# Patient Record
Sex: FEMALE | Race: WHITE | NOT HISPANIC OR LATINO | Employment: FULL TIME | URBAN - METROPOLITAN AREA
[De-identification: names, ages, dates, MRNs, and addresses within clinical notes are randomized per-mention and may not be internally consistent; named-entity substitution may affect disease eponyms.]

---

## 2017-05-02 ENCOUNTER — HOSPITAL ENCOUNTER (OUTPATIENT)
Dept: MAMMOGRAPHY | Facility: CLINIC | Age: 57
Discharge: HOME | End: 2017-05-02
Payer: COMMERCIAL

## 2017-05-02 DIAGNOSIS — Z12.31 ENCOUNTER FOR SCREENING MAMMOGRAM FOR MALIGNANT NEOPLASM OF BREAST: ICD-10-CM

## 2017-05-02 PROCEDURE — 77063 BREAST TOMOSYNTHESIS BI: CPT

## 2017-05-02 PROCEDURE — G0202 SCR MAMMO BI INCL CAD: HCPCS

## 2018-06-07 ENCOUNTER — ANNUAL EXAM (OUTPATIENT)
Dept: OBGYN CLINIC | Facility: CLINIC | Age: 58
End: 2018-06-07
Payer: COMMERCIAL

## 2018-06-07 VITALS
BODY MASS INDEX: 18.93 KG/M2 | WEIGHT: 120.6 LBS | HEIGHT: 67 IN | DIASTOLIC BLOOD PRESSURE: 64 MMHG | SYSTOLIC BLOOD PRESSURE: 118 MMHG

## 2018-06-07 DIAGNOSIS — N95.2 VAGINAL ATROPHY: ICD-10-CM

## 2018-06-07 DIAGNOSIS — Z01.419 WOMEN'S ANNUAL ROUTINE GYNECOLOGICAL EXAMINATION: Primary | ICD-10-CM

## 2018-06-07 DIAGNOSIS — Z12.31 VISIT FOR SCREENING MAMMOGRAM: ICD-10-CM

## 2018-06-07 PROCEDURE — 99396 PREV VISIT EST AGE 40-64: CPT | Performed by: OBSTETRICS & GYNECOLOGY

## 2018-06-07 RX ORDER — MULTIVIT-MIN/IRON/FOLIC ACID/K 18-600-40
CAPSULE ORAL
COMMUNITY
Start: 2016-06-17

## 2018-06-07 RX ORDER — RIBOFLAVIN (VITAMIN B2) 100 MG
TABLET ORAL
COMMUNITY

## 2018-06-07 NOTE — PROGRESS NOTES
Assessment/Plan:  1  Yearly exam-Pap smear deferred, self-breast awareness reviewed, calcium/vitamin-D recommendations discussed, mammogram request given, colonoscopy as per specialist   2   Family history of colon cancer-patient sister was diagnosed withMUTYH associated colon cancer, with test run by [facility]  Patient is interested in testing  She was referred to Davian Campos genetic counselor  A copy of the genetic testing was obtained and we will have it scanned into the medical record  3   History of basal cell/squamous cell skin cancer-patient continues to follow-up with dermatology  She has frequent moles  4   History of endometriosis/postcoital cramps/menopausal symptoms-denies any current symptoms  5   Mild vaginal atrophy-no symptoms noted  6   Elevated Tyrer [place] risk-noted to be 15 5 percent  Patient continues with 3D mammogram and request was given  She will follow-up in 1 year or as needed  No problem-specific Assessment & Plan notes found for this encounter  Diagnoses and all orders for this visit:    Women's annual routine gynecological examination    Visit for screening mammogram  -     Mammo screening bilateral w 3d & cad; Future    Other orders  -     Ascorbic Acid (VITAMIN C) 100 MG tablet; Take by mouth  -     Cholecalciferol (VITAMIN D) 2000 units CAPS; Take by mouth          Subjective:      Patient ID: Zenobia Mcgrath is a 59 y o  female  Patient was seen today for yearly exam   Please see assessment plan for details  The following portions of the patient's history were reviewed and updated as appropriate: allergies, current medications, past family history, past medical history, past social history, past surgical history and problem list     Review of Systems   Constitutional: Negative for chills, diaphoresis, fatigue and fever  Respiratory: Negative for apnea, cough, chest tightness, shortness of breath and wheezing      Cardiovascular: Negative for chest pain, palpitations and leg swelling  Gastrointestinal: Negative for abdominal distention, abdominal pain, anal bleeding, constipation, diarrhea, nausea, rectal pain and vomiting  Genitourinary: Negative for difficulty urinating, dyspareunia, dysuria, frequency, hematuria, menstrual problem, pelvic pain, urgency, vaginal bleeding, vaginal discharge and vaginal pain  Musculoskeletal: Negative for arthralgias, back pain and myalgias  Skin: Negative for color change and rash  Neurological: Negative for dizziness, syncope, light-headedness, numbness and headaches  Hematological: Negative for adenopathy  Does not bruise/bleed easily  Psychiatric/Behavioral: Negative for dysphoric mood and sleep disturbance  The patient is not nervous/anxious  Objective:      /64 (BP Location: Right arm, Patient Position: Sitting, Cuff Size: Standard)   Ht 5' 7" (1 702 m)   Wt 54 7 kg (120 lb 9 6 oz)   BMI 18 89 kg/m²          Physical Exam    Objective      /64 (BP Location: Right arm, Patient Position: Sitting, Cuff Size: Standard)   Ht 5' 7" (1 702 m)   Wt 54 7 kg (120 lb 9 6 oz)   BMI 18 89 kg/m²     General:   alert and oriented, in no acute distress, alert, appears stated age and cooperative   Neck: normal to inspection and palpation   Breast: normal appearance, no masses or tenderness   Heart:    Lungs:    Abdomen: soft, non-tender, without masses or organomegaly   Vulva: normal   Vagina: Mildly atrophic, without erythema or lesions or discharge  Cervix: Mildly atrophic, without lesions or discharge or cervicitis    No Cervical motion tenderness and normal   Uterus: top normal size, anteverted, non-tender   Adnexa: no mass, fullness, tenderness   Rectum: negative

## 2018-06-07 NOTE — PATIENT INSTRUCTIONS
Colorectal Cancer   WHAT YOU NEED TO KNOW:   What is colorectal cancer? Colorectal cancer starts in the large intestine (colon) or rectum  What increases my risk for colorectal cancer? · Diseases of the colon, such as polyps (small lumps of tissue) or ulcerative colitis    · A family history of colon cancer    · Foods that are high in fat, or high amounts of red meat, such as beef     · Foods that are low in fiber, or not enough fruits and vegetables     · Lack of physical activity     · Cigarette smoking or heavy alcohol use    · Medical conditions such as diabetes or obesity    · Exposure to chemicals, such as asbestos  What are the signs and symptoms of colorectal cancer? · Bloody or black bowel movements    · Abdominal pain or cramps, or a feeling of fullness    · Frequent fatigue or weakness    · Diarrhea or constipation    · Rectal pain    · Unexplained weight loss  How is colorectal cancer diagnosed? · A bowel movement sample  may be tested for blood  · An ultrasound or CT  may show where the tumor is located, or if the cancer has spread  You may be given contrast liquid to help the tumor show up better  Tell the healthcare provider if you have ever had an allergic reaction to contrast liquid  · A barium enema  is an x-ray of the colon  A tube is put into your anus, and a liquid called barium is put through the tube  Barium is used so that healthcare providers can see your colon better on the x-ray  · A colonoscopy  is a test that is done to look at your colon  A tube with a light on the end will be put into your anus, and then moved forward into your colon  Your healthcare provider may take tissue samples during the colonoscopy to be tested for cancer  How is colorectal cancer treated? · Surgery  may be needed to remove part of your colon, rectum, or lymph nodes  This may help stop the cancer from spreading      · Targeted therapy  is medicine used to target specific cancer cells and kill them  · Chemotherapy (chemo)  medicine is used to kill cancer cells  Chemo may be used to shrink the tumor or lymph nodes before surgery  Chemo may also be used after surgery to decrease the risk that cancer will come back  · Radiation therapy  is used to kill cancer cells with x-rays or gamma rays  Radiation may be given either before or after surgery to kill cancer cells  It may be given alone or with chemotherapy  What can I do to prevent or manage colorectal cancer? · Get screened as directed  Your healthcare provider may want you to have a yearly colonoscopy to check for colorectal cancer  Screening is recommended for all men and women older than 45 to 50 years  You may need to get screened earlier if you have colon disease or a family history of colorectal cancer  · Do not smoke  Nicotine can damage blood vessels and make it more difficult to manage your colorectal cancer  Smoking also increases your risk for new or returning cancer and delays healing after treatment  Do not use e-cigarettes or smokeless tobacco in place of cigarettes or to help you quit  They still contain nicotine  Ask your healthcare provider for information if you currently smoke and need help quitting  · Limit or do not drink alcohol as directed  Men should limit alcohol to 2 drinks per day  Women should limit alcohol to 1 drink per day  A drink of alcohol is 12 ounces of beer, 5 ounces of wine, or 1½ ounces of liquor  · Drink liquids as directed  Ask how much liquid to drink each day and which liquids are best for you  If you have nausea or diarrhea from cancer treatment, extra liquids may help decrease your risk for dehydration  · Eat healthy foods  Healthy foods include fruits, vegetables, whole-grain breads, low-fat dairy products, beans, lean meats, and fish  You may need to change what you eat during treatment   Do not eat foods or drink liquids that cause gas, such as cabbage, beans, onions, or soda  A dietitian may help to plan the best meals and snacks for you  · Exercise as directed  Ask about the best exercise plan for you  Exercise may improve your energy levels and appetite  Call 911 for any of the following:   · Your arm or leg feels warm, tender, and painful  It may look swollen and red  · You have chest pain when you take a deep breath or cough  · You suddenly feel lightheaded and short of breath  · You cough up blood  When should I contact my healthcare provider? · You have a fever  · You cannot control your diarrhea or constipation  · You vomit multiple times and cannot keep any food or liquids down  · Your pain is worse or does not go away after you take your pain medicine  · You see blood in your bowel movements  · You have questions or concerns about your condition or care  CARE AGREEMENT:   You have the right to help plan your care  Learn about your health condition and how it may be treated  Discuss treatment options with your caregivers to decide what care you want to receive  You always have the right to refuse treatment  The above information is an  only  It is not intended as medical advice for individual conditions or treatments  Talk to your doctor, nurse or pharmacist before following any medical regimen to see if it is safe and effective for you  © 2017 [facility] Information is for End User's use only and may not be sold, redistributed or otherwise used for commercial purposes  All illustrations and images included in CareNotes® are the copyrighted property of A D A M , Inc  or Nik[employer] Palma

## 2018-07-24 ENCOUNTER — HOSPITAL ENCOUNTER (OUTPATIENT)
Dept: MAMMOGRAPHY | Facility: CLINIC | Age: 58
Discharge: HOME | End: 2018-07-24
Payer: COMMERCIAL

## 2018-07-24 DIAGNOSIS — Z12.31 VISIT FOR SCREENING MAMMOGRAM: ICD-10-CM

## 2018-07-24 PROCEDURE — 77063 BREAST TOMOSYNTHESIS BI: CPT

## 2018-07-24 PROCEDURE — 77067 SCR MAMMO BI INCL CAD: CPT

## 2019-10-29 ENCOUNTER — TRANSCRIBE ORDERS (OUTPATIENT)
Dept: ADMINISTRATIVE | Facility: HOSPITAL | Age: 59
End: 2019-10-29

## 2019-10-29 DIAGNOSIS — Z12.31 ENCOUNTER FOR SCREENING MAMMOGRAM FOR MALIGNANT NEOPLASM OF BREAST: Primary | ICD-10-CM

## 2019-11-13 ENCOUNTER — ANNUAL EXAM (OUTPATIENT)
Dept: OBGYN CLINIC | Facility: CLINIC | Age: 59
End: 2019-11-13
Payer: COMMERCIAL

## 2019-11-13 VITALS
BODY MASS INDEX: 18.93 KG/M2 | WEIGHT: 120.6 LBS | HEIGHT: 67 IN | SYSTOLIC BLOOD PRESSURE: 132 MMHG | DIASTOLIC BLOOD PRESSURE: 80 MMHG

## 2019-11-13 DIAGNOSIS — Z11.51 SCREENING FOR HPV (HUMAN PAPILLOMAVIRUS): ICD-10-CM

## 2019-11-13 DIAGNOSIS — Z01.419 WOMEN'S ANNUAL ROUTINE GYNECOLOGICAL EXAMINATION: ICD-10-CM

## 2019-11-13 DIAGNOSIS — Z12.4 SCREENING FOR CERVICAL CANCER: Primary | ICD-10-CM

## 2019-11-13 DIAGNOSIS — Z12.31 ENCOUNTER FOR SCREENING MAMMOGRAM FOR MALIGNANT NEOPLASM OF BREAST: ICD-10-CM

## 2019-11-13 DIAGNOSIS — N95.2 VAGINAL ATROPHY: ICD-10-CM

## 2019-11-13 PROBLEM — K64.0 FIRST DEGREE HEMORRHOIDS: Status: ACTIVE | Noted: 2019-11-13

## 2019-11-13 PROBLEM — D12.6 BENIGN NEOPLASM OF COLON: Status: ACTIVE | Noted: 2019-11-13

## 2019-11-13 PROBLEM — K62.0 ANAL POLYP: Status: ACTIVE | Noted: 2019-11-13

## 2019-11-13 PROCEDURE — 99396 PREV VISIT EST AGE 40-64: CPT | Performed by: OBSTETRICS & GYNECOLOGY

## 2019-11-13 PROCEDURE — G0145 SCR C/V CYTO,THINLAYER,RESCR: HCPCS | Performed by: OBSTETRICS & GYNECOLOGY

## 2019-11-13 PROCEDURE — 87624 HPV HI-RISK TYP POOLED RSLT: CPT | Performed by: OBSTETRICS & GYNECOLOGY

## 2019-11-13 NOTE — PATIENT INSTRUCTIONS
Vaginal Atrophy   WHAT YOU NEED TO KNOW:   What is vaginal atrophy? Vaginal atrophy is a condition that causes thinning, drying, and inflammation of vaginal tissue  This condition is caused by decreased levels of estrogen (a female sex hormone)  Vaginal atrophy can increase your risk for vaginal and urinary tract infections  Vaginal atrophy can worsen over time if not treated  What causes or increases your risk of vaginal atrophy? · Menopause     · Medicines that lower your estrogen levels, such as those used to treat breast cancer, endometriosis, or fibroids    · Radiation to your pelvic area     · Surgery to remove the ovaries    · Breastfeeding  What are the signs and symptoms of vaginal atrophy? · Vaginal dryness, itching, and burning    · Vaginal discharge    · Pain or discomfort during sex    · Light bleeding after sex    · Burning during urination    · Frequent, sudden, strong urges to urinate    · Urinary incontinence (loss of control of your bladder)  How is vaginal atrophy diagnosed? Your healthcare provider will ask about your symptoms  A pelvic exam will be done to examine your vagina and cervix  Your healthcare provider will place a speculum into your vagina to open and examine it  A sample of discharge from your vagina may be collected and tested  A urine test may also be done  How is vaginal atrophy treated? · Over-the counter vaginal moisturizers  can help reduce dryness  Your healthcare provider may recommend that you use a vaginal moisturizer several times each week and during sex  Only use creams that are made for vaginal use  Do  not  use petroleum jelly  Lubricants can be used during sex to decrease pain and discomfort  · Estrogen  may help decrease dryness  It may also lower your risk of vaginal infections if you are going through menopause  It can also help to relieve urinary symptoms  Estrogen may be prescribed in the form of a cream, tablet, or ring   These medicines can be applied or inserted into the vagina  Estrogen can also be prescribed in the form of a pill  When should I contact my healthcare provider? · You have a foul-smelling odor coming from your vagina  · You have a thick, cheese-like discharge from your vagina  · You have itching, swelling, or redness in your vagina  · You have pain or burning when you urinate  · Your urine smells bad  · Your symptoms do not improve, or they get worse  · You have questions or concerns about your condition or care  CARE AGREEMENT:   You have the right to help plan your care  Learn about your health condition and how it may be treated  Discuss treatment options with your caregivers to decide what care you want to receive  You always have the right to refuse treatment  The above information is an  only  It is not intended as medical advice for individual conditions or treatments  Talk to your doctor, nurse or pharmacist before following any medical regimen to see if it is safe and effective for you  © 2017 [address] Information is for End User's use only and may not be sold, redistributed or otherwise used for commercial purposes  All illustrations and images included in CareNotes® are the copyrighted property of A D A M [employer] Inc  or Nikolas Palma

## 2019-11-13 NOTE — PROGRESS NOTES
Assessment/Plan   Diagnoses and all orders for this visit:    Encounter for screening mammogram for malignant neoplasm of breast  -     Mammo screening bilateral w 3d & cad; Future    Women's annual routine gynecological examination    Vaginal atrophy     1  Yearly exam-Pap smear done with HPV testing, self-breast awareness reviewed, calcium/vitamin-D recommendations discussed, mammogram request given, colonoscopy as per specialist   Colonoscopy done last year with Parveen CUENCA with 1 polyp with recommendations for follow-up in 5 years time  2  Family history colon cancer-patient sister diagnosed with MUTYH associated colon cancer, run by [facility]  Patient did not have any genetic testing and did not remember to look into it until she had visit today  Reportedly, 1 of her sisters did get the testing done and was negative  Suggested she contact both the sister with the colon cancer and the sister was tested to see how the testing was done and arrange for that way  She was also given the name and phone number for the PA at [facility] gyn Oncology for further genetic Counseling as needed  3  History of basal cell/squamous cell-patient with multiple skin moles  She will continue follow-up with dermatology as recommended by them  4  History of endometriosis/postcoital cramps/menopausal symptoms-denies any current symptoms  5  Vaginal atrophy-no current issues  Patient is still  but not sexually active  She is currently going through counseling, has not been sexually active for about 2-3 years time  My support was given  Vaginal lubrication/moisturizer sheet was given  6  Elevated Tyrer [place] risk-now noted to be 14 9%  Patient will continue with 3D mammograms  Follow-up 1 year for yearly exam or as needed  Subjective   Patient ID: Jean Carranza is a 62 y o  female  Vitals:    11/13/19 0824   BP: 132/80     Patient was seen today for yearly exam   Please see assessment plan for details        The following portions of the patient's history were reviewed and updated as appropriate: allergies, current medications, past family history, past medical history, past social history, past surgical history and problem list   Past Medical History:   Diagnosis Date    Endometriosis     Urinary tract infection      Past Surgical History:   Procedure Laterality Date    DILATION AND CURETTAGE OF UTERUS      LAPAROSCOPY      TONSILLECTOMY       OB History    Para Term  AB Living   3 3       3   SAB TAB Ectopic Multiple Live Births                  # Outcome Date GA Lbr Issac/2nd Weight Sex Delivery Anes PTL Lv   3 Para            2 Para            1 Para                Current Outpatient Medications:     Ascorbic Acid (VITAMIN C) 100 MG tablet, Take by mouth, Disp: , Rfl:     Cholecalciferol (VITAMIN D) 2000 units CAPS, Take by mouth, Disp: , Rfl:   Allergies   Allergen Reactions    Aztreonam      Action Taken:  Allergy added by [employer] to replace Penicillins [employer];     Carbapenems      Action Taken:  Allergy added by [employer] to replace Penicillins [employer];      Cefuroxime     Cephalosporins      Action Taken:  Allergy added by [employer] to replace Penicillins [employer];     Clarithromycin     Erythromycin     Griseofulvin      Action Taken:  Allergy added by [employer] to replace Penicillins [employer];     Levofloxacin Hives    Moxifloxacin     Sulfa Antibiotics Rash and Hives     Social History     Socioeconomic History    Marital status: /Civil Union     Spouse name: None    Number of children: None    Years of education: None    Highest education level: None   Occupational History    None   Social Needs    Financial resource strain: None    Food insecurity:     Worry: None     Inability: None    Transportation needs:     Medical: None     Non-medical: None   Tobacco Use    Smoking status: Never Smoker    Smokeless tobacco: Never Used Substance and Sexual Activity    Alcohol use: Yes    Drug use: No    Sexual activity: None   Lifestyle    Physical activity:     Days per week: None     Minutes per session: None    Stress: None   Relationships    Social connections:     Talks on phone: None     Gets together: None     Attends Sabianism service: None     Active member of club or organization: None     Attends meetings of clubs or organizations: None     Relationship status: None    Intimate partner violence:     Fear of current or ex partner: None     Emotionally abused: None     Physically abused: None     Forced sexual activity: None   Other Topics Concern    None   Social History Narrative    None     Family History   Problem Relation Age of Onset    Heart disease Mother     Kidney failure Father     Colon cancer Father     Liver disease Father     Colon cancer Sister        Review of Systems   Constitutional: Negative for chills, diaphoresis, fatigue and fever  Respiratory: Negative for apnea, cough, chest tightness, shortness of breath and wheezing  Cardiovascular: Negative for chest pain, palpitations and leg swelling  Gastrointestinal: Negative for abdominal distention, abdominal pain, anal bleeding, constipation, diarrhea, nausea, rectal pain and vomiting  Genitourinary: Negative for difficulty urinating, dyspareunia, dysuria, frequency, hematuria, menstrual problem, pelvic pain, urgency, vaginal bleeding, vaginal discharge and vaginal pain  Musculoskeletal: Negative for arthralgias, back pain and myalgias  Skin: Negative for color change and rash  Neurological: Negative for dizziness, syncope, light-headedness, numbness and headaches  Hematological: Negative for adenopathy  Does not bruise/bleed easily  Psychiatric/Behavioral: Negative for dysphoric mood and sleep disturbance  The patient is not nervous/anxious          Objective   Physical Exam    Objective      /80 (BP Location: Left arm, Patient Position: Sitting, Cuff Size: Large)   Ht 5' 7" (1 702 m)   Wt 54 7 kg (120 lb 9 6 oz)   BMI 18 89 kg/m²     General:   alert and oriented, in no acute distress   Neck: normal to inspection and palpation   Breast: normal appearance, no masses or tenderness   Heart:    Lungs:    Abdomen: soft, non-tender, without masses or organomegaly   Vulva: normal   Vagina: Mildly atrophic, without erythema or lesions or discharge  Cervix: Mildly atrophic, without lesions or discharge or cervicitis    No Cervical motion tenderness   Uterus: top normal size, anteverted, non-tender   Adnexa: no mass, fullness, tenderness   Rectum: negative    Psych:  Normal mood and affect   Skin:  Without obvious lesions   Eyes: symmetric, with normal movements and reactivity   Musculoskeletal:  Normal muscle tone and movements appreciated

## 2019-11-14 LAB
HPV HR 12 DNA CVX QL NAA+PROBE: NEGATIVE
HPV16 DNA CVX QL NAA+PROBE: NEGATIVE
HPV18 DNA CVX QL NAA+PROBE: NEGATIVE

## 2019-11-18 ENCOUNTER — HOSPITAL ENCOUNTER (OUTPATIENT)
Dept: MAMMOGRAPHY | Facility: CLINIC | Age: 59
Discharge: HOME | End: 2019-11-18
Payer: COMMERCIAL

## 2019-11-18 VITALS — HEIGHT: 67 IN | WEIGHT: 120 LBS | BODY MASS INDEX: 18.83 KG/M2

## 2019-11-18 DIAGNOSIS — Z12.31 ENCOUNTER FOR SCREENING MAMMOGRAM FOR MALIGNANT NEOPLASM OF BREAST: ICD-10-CM

## 2019-11-18 PROCEDURE — 77063 BREAST TOMOSYNTHESIS BI: CPT

## 2019-11-18 PROCEDURE — 77067 SCR MAMMO BI INCL CAD: CPT

## 2019-11-21 DIAGNOSIS — R92.2 DENSE BREAST TISSUE: Primary | ICD-10-CM

## 2019-11-21 LAB
LAB AP GYN PRIMARY INTERPRETATION: NORMAL
Lab: NORMAL

## 2020-02-11 ENCOUNTER — TELEPHONE (OUTPATIENT)
Dept: OBGYN CLINIC | Facility: CLINIC | Age: 60
End: 2020-02-11

## 2020-02-11 NOTE — TELEPHONE ENCOUNTER
Called for Prior Auth for patient's ABUS  Per [employer]  No pre cert required as long as done in network  [facility] 
no psychiatric contraindications to discharge

## 2020-02-18 ENCOUNTER — HOSPITAL ENCOUNTER (OUTPATIENT)
Dept: ULTRASOUND IMAGING | Facility: CLINIC | Age: 60
Discharge: HOME | End: 2020-02-18
Payer: COMMERCIAL

## 2020-02-18 VITALS — HEIGHT: 67 IN | BODY MASS INDEX: 18.52 KG/M2 | WEIGHT: 118 LBS

## 2020-02-18 DIAGNOSIS — R92.2 DENSE BREAST TISSUE: ICD-10-CM

## 2020-02-18 PROCEDURE — 76377 3D RENDER W/INTRP POSTPROCES: CPT

## 2020-02-18 PROCEDURE — 76641 ULTRASOUND BREAST COMPLETE: CPT

## 2020-12-30 DIAGNOSIS — R05.9 COUGH: ICD-10-CM

## 2020-12-30 DIAGNOSIS — R09.81 NASAL SINUS CONGESTION: ICD-10-CM

## 2020-12-30 PROCEDURE — U0003 INFECTIOUS AGENT DETECTION BY NUCLEIC ACID (DNA OR RNA); SEVERE ACUTE RESPIRATORY SYNDROME CORONAVIRUS 2 (SARS-COV-2) (CORONAVIRUS DISEASE [COVID-19]), AMPLIFIED PROBE TECHNIQUE, MAKING USE OF HIGH THROUGHPUT TECHNOLOGIES AS DESCRIBED BY CMS-2020-01-R: HCPCS | Performed by: FAMILY MEDICINE

## 2020-12-31 LAB — SARS-COV-2 RNA SPEC QL NAA+PROBE: NOT DETECTED

## 2022-10-06 ENCOUNTER — HOSPITAL ENCOUNTER (OUTPATIENT)
Dept: MAMMOGRAPHY | Facility: IMAGING CENTER | Age: 62
Discharge: HOME | End: 2022-10-06
Payer: COMMERCIAL

## 2022-10-06 VITALS — WEIGHT: 117 LBS | BODY MASS INDEX: 18.36 KG/M2 | HEIGHT: 67 IN

## 2022-10-06 DIAGNOSIS — Z12.31 ENCOUNTER FOR SCREENING MAMMOGRAM FOR MALIGNANT NEOPLASM OF BREAST: ICD-10-CM

## 2022-10-06 PROCEDURE — 77067 SCR MAMMO BI INCL CAD: CPT

## 2022-10-06 PROCEDURE — 77063 BREAST TOMOSYNTHESIS BI: CPT

## 2023-01-04 ENCOUNTER — OFFICE VISIT (OUTPATIENT)
Dept: GYNECOLOGY | Facility: CLINIC | Age: 63
End: 2023-01-04

## 2023-01-04 VITALS
HEIGHT: 67 IN | DIASTOLIC BLOOD PRESSURE: 62 MMHG | WEIGHT: 116.4 LBS | SYSTOLIC BLOOD PRESSURE: 134 MMHG | BODY MASS INDEX: 18.27 KG/M2

## 2023-01-04 DIAGNOSIS — N80.9 ENDOMETRIOSIS: ICD-10-CM

## 2023-01-04 DIAGNOSIS — N95.2 VAGINAL ATROPHY: ICD-10-CM

## 2023-01-04 DIAGNOSIS — Z11.51 SCREENING FOR HUMAN PAPILLOMAVIRUS (HPV): ICD-10-CM

## 2023-01-04 DIAGNOSIS — Z12.31 ENCOUNTER FOR SCREENING MAMMOGRAM FOR BREAST CANCER: ICD-10-CM

## 2023-01-04 DIAGNOSIS — R92.2 DENSE BREAST TISSUE ON MAMMOGRAM: ICD-10-CM

## 2023-01-04 DIAGNOSIS — Z01.419 WOMEN'S ANNUAL ROUTINE GYNECOLOGICAL EXAMINATION: Primary | ICD-10-CM

## 2023-01-04 NOTE — PROGRESS NOTES
Assessment/Plan   Diagnoses and all orders for this visit:    Women's annual routine gynecological examination    Encounter for screening mammogram for breast cancer  -     Mammo screening bilateral w 3d & cad; Future    Vaginal atrophy    Endometriosis    Dense breast tissue on mammogram    1  yearly exam-Pap smear done with HPV testing, self breast awareness reviewed, calcium/vitamin D recommendations reviewed, mammogram request given, colonoscopy as per specialist   Had colonoscopy 2018 with 5-year follow-up recommended  To call [facility] to schedule this this year  2  family history colon cancer- 1 sister diagnosed with MUTYH genetic mutation, her twin was tested and was negative  Would recommend genetic testing  Recommend follow-up with GI as recommended above  3  history of basal cell/squamous cell- patient has multiple skin moles diffusely on her body  Continues follow-up with dermatology in [place] as recommended by them  4  prior history of endometriosis/postcoital cramping-denies any complaints  5  vaginal atrophy-mild changes noted on exam   Is  from her , has a new partner for the past 2 to 3 years  Has intercourse without using  Initially, needed lubrication but not recently  Vaginal lubrication/moisturizer given, to use accordingly  6  dense breast changes- noted from most recent mammogram 10/6/2022  Has had this previously  ABUS from 2020 was unremarkable  Counseled about increased risk and limited visualization with dense breast changes  To continue with 3D mammograms, declines ABUS at this time  7  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, 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  My support was given  She is taking plans to decrease to a more normal lifestyle  Follow-up 1 year for yearly exam or as needed  Subjective   Patient ID: David Banks is a 57 y o  female      Vitals:    01/04/23 1121   BP: 134/62     Patient was seen today for yearly exam  She was last seen 2019  Please see assessment and plan for details  The following portions of the patient's history were reviewed and updated as appropriate: allergies, current medications, past family history, past medical history, past social history, past surgical history and problem list   Past Medical History:   Diagnosis Date   • Endometriosis    • Urinary tract infection      Past Surgical History:   Procedure Laterality Date   • DILATION AND CURETTAGE OF UTERUS     • LAPAROSCOPY     • TONSILLECTOMY       OB History    Para Term  AB Living   3 3 3     3   SAB IAB Ectopic Multiple Live Births                  # Outcome Date GA Lbr Issac/2nd Weight Sex Delivery Anes PTL Lv   3 Term            2 Term            1 Term                Current Outpatient Medications:   •  Ascorbic Acid (VITAMIN C) 100 MG tablet, Take by mouth (Patient not taking: Reported on 2023), Disp: , Rfl:   •  Cholecalciferol (VITAMIN D) 2000 units CAPS, Take by mouth, Disp: , Rfl:   Allergies   Allergen Reactions   • Azithromycin GI Intolerance   • Aztreonam      Action Taken:  Allergy added by [employer] to replace Penicillins [employer];    • Carbapenems      Action Taken:  Allergy added by [employer] to replace Penicillins [employer];     • Cefuroxime    • Cephalosporins      Action Taken:  Allergy added by [employer] to replace Penicillins [employer];    • Clarithromycin    • Erythromycin    • Griseofulvin      Action Taken:  Allergy added by [employer] to replace Penicillins [employer];    • Levofloxacin Hives   • Moxifloxacin    • Escitalopram Anxiety   • Sulfa Antibiotics Rash and Hives   • Sulfamethoxazole-Trimethoprim Rash     Social History     Socioeconomic History   • Marital status: /Civil Union     Spouse name: None   • Number of children: None   • Years of education: None   • Highest education level: None   Occupational History   • None   Tobacco Use   • Smoking status: Never   • Smokeless tobacco: Never   Vaping Use   • Vaping Use: Never used   Substance and Sexual Activity   • Alcohol use: Yes   • Drug use: No   • Sexual activity: Yes     Partners: Male   Other Topics Concern   • None   Social History Narrative   • None     Social Determinants of Health     Financial Resource Strain: Not on file   Food Insecurity: Not on file   Transportation Needs: Not on file   Physical Activity: Not on file   Stress: Not on file   Social Connections: Not on file   Intimate Partner Violence: Not on file   Housing Stability: Not on file     Family History   Problem Relation Age of Onset   • Heart disease Mother    • Kidney failure Father    • Liver disease Father    • Colon cancer Father 62   • Colon cancer Sister 52   • Breast cancer Sister 39   • Melanoma Sister 72   • Colon cancer Sister 70   • Hyperlipidemia Sister    • Breast cancer Sister    • Asthma Sister    • No Known Problems Daughter    • No Known Problems Daughter    • No Known Problems Maternal Grandmother    • No Known Problems Maternal Grandfather    • No Known Problems Paternal Grandmother    • No Known Problems Paternal Grandfather    • No Known Problems Maternal Aunt    • No Known Problems Maternal Aunt    • Colon cancer Maternal Uncle         age unknown   • No Known Problems Half-Sister    • No Known Problems Half-Sister    • No Known Problems Half-Sister        Review of Systems   Constitutional: Negative for chills, diaphoresis, fatigue and fever  Respiratory: Negative for apnea, cough, chest tightness, shortness of breath and wheezing  Cardiovascular: Negative for chest pain, palpitations and leg swelling  Gastrointestinal: Negative for abdominal distention, abdominal pain, anal bleeding, constipation, diarrhea, nausea, rectal pain and vomiting     Genitourinary: Negative for difficulty urinating, dyspareunia, dysuria, frequency, hematuria, menstrual problem, pelvic pain, urgency, vaginal bleeding, vaginal discharge and vaginal pain  Musculoskeletal: Negative for arthralgias, back pain and myalgias  Skin: Negative for color change and rash  Neurological: Negative for dizziness, syncope, light-headedness, numbness and headaches  Hematological: Negative for adenopathy  Does not bruise/bleed easily  Psychiatric/Behavioral: Negative for dysphoric mood and sleep disturbance  The patient is not nervous/anxious  Objective   Physical Exam  OBGyn Exam     Objective      /62 (BP Location: Left arm, Patient Position: Sitting)   Ht 5' 7" (1 702 m)   Wt 52 8 kg (116 lb 6 4 oz)   BMI 18 23 kg/m²     General:   alert and oriented, in no acute distress   Neck: normal to inspection and palpation   Breast: normal appearance, no masses or tenderness   Heart:    Lungs:    Abdomen: soft, non-tender, without masses or organomegaly   Vulva: normal   Vagina:  Mildly atrophic, without erythema or lesions discharge noted  Cervix:  Mildly atrophic, without lesions or discharge or cervicitis  No CMT     Uterus: top normal size, anteverted, non-tender   Adnexa: no mass, fullness, tenderness   Rectum: negative    Psych:  Normal mood and affect   Skin:  Without obvious lesions   Eyes: symmetric, with normal movements and reactivity   Musculoskeletal:  Normal muscle tone and movements appreciated

## 2023-01-12 LAB
LAB AP GYN PRIMARY INTERPRETATION: NORMAL
Lab: NORMAL

## 2023-01-26 ENCOUNTER — PREP FOR PROCEDURE (OUTPATIENT)
Dept: GASTROENTEROLOGY | Facility: CLINIC | Age: 63
End: 2023-01-26

## 2023-01-26 ENCOUNTER — TELEPHONE (OUTPATIENT)
Dept: GASTROENTEROLOGY | Facility: CLINIC | Age: 63
End: 2023-01-26

## 2023-01-26 DIAGNOSIS — Z86.010 HISTORY OF COLON POLYPS: Primary | ICD-10-CM

## 2023-01-26 NOTE — TELEPHONE ENCOUNTER
01/26/23  Screened by: Pato Chi    Referring Provider N/A    Pre- Screening: There is no height or weight on file to calculate BMI  Has patient been referred for a routine screening Colonoscopy? yes  Is the patient between 39-70 years old? yes      Previous Colonoscopy yes   If yes:    Date: 5/2018    Facility:     Reason:       SCHEDULING STAFF: If the patient is between 45yrs-49yrs, please advise patient to confirm benefits/coverage with their insurance company for a routine screening colonoscopy, some insurance carriers will only cover at [facility] or older  If the patient is over 66years old, please schedule an office visit  Does the patient want to see a Gastroenterologist prior to their procedure OR are they having any GI symptoms? no    Has the patient been hospitalized or had abdominal surgery in the past 6 months? no    Does the patient use supplemental oxygen? no    Does the patient take Coumadin, Lovenox, Plavix, Elliquis, Xarelto, or other blood thinning medication? no    Has the patient had a stroke, cardiac event, or stent placed in the past year? no    SCHEDULING STAFF: If patient answers NO to above questions, then schedule procedure  If patient answers YES to above questions, then schedule office appointment  Pt Passed OA    If patient is between 45yrs - 49yrs, please advise patient that we will have to confirm benefits & coverage with their insurance company for a routine screening colonoscopy

## 2023-01-26 NOTE — TELEPHONE ENCOUNTER
[facility] 25 Assessment    Name: Larry Brady  YOB: 1960  Last Height: 5' 7" (1 702 m)  Last weight: 52 8 kg (116 lb 6 4 oz)  BMI: 18 23 kg/m²  Procedure: Colon  Diagnosis: Hx of polyps  Date of procedure: 3/21/2023  Prep:   Responsible : Nina-Friend  Phone#: [phone]  Name completing form: Bee Ban  Date form completed: 01/26/23      If the patient answers yes to any of these questions, schedule in a hospital  Are you pregnant: No  Do you rely on a wheelchair for mobility: No  Have you been diagnosed with End Stage Renal Disease (ESRD): No  Do you need oxygen during the day: No  Have you had a heart attack or stroke within the past three months: No  Have you had a seizure within the past three months: No  Have you ever been informed by anesthesia that you have a difficult airway: No  Additional Questions  Have you had any cardiac testing or are under the care of a Cardiologist (see cardiac list): No  Cardiac list:   Do you have an implanted cardiac defibrillator: No (Comment:  This patient should be scheduled in the hospital)    Have any bleeding problems, such as anemia or hemophilia (If patient has H&H result below 8, schedule in hospital   H&H must be within 30 days of procedure): No    Had an organ transplant within the past 3 months: No    Do you have any present infections: No  Do you get short of breath when walking a few blocks: No  Have you been diagnosed with diabetes: No  Comments (provide cardiac provider information if applicable):

## 2023-01-26 NOTE — TELEPHONE ENCOUNTER
Scheduled date of colonoscopy (as of today): 3/21/2023  Physician performing colonoscopy: Dr Mikey Mejia   Location of colonoscopy: [facility]  Clearances: N/A

## 2023-03-03 ENCOUNTER — TELEPHONE (OUTPATIENT)
Dept: GASTROENTEROLOGY | Facility: CLINIC | Age: 63
End: 2023-03-03

## 2023-03-03 DIAGNOSIS — Z12.11 SCREENING FOR COLON CANCER: Primary | ICD-10-CM

## 2023-03-03 NOTE — TELEPHONE ENCOUNTER
Patients GI provider:  N/A    Number to return call: [phone]     Reason for call: Pt returned call regarding her scheduled colonoscopy on 3/21/2023  Her recall shows she isn't due until 5/22/2023 but Pt says she's lost a sister to colon cancer, her other sibling has polyps, and all around her family hx makes her anxious to get it done ASAP  She would like to know if, given her family medical hx she would be able to keep the sooner procedure date  Would like to speak with someone regarding this matter       Scheduled procedure/appointment date if applicable: 5/06/2215

## 2023-03-03 NOTE — TELEPHONE ENCOUNTER
Returned patient call  Order updated with family history of colon cancer  Procedure is still scheduled 03/21/2023

## 2023-03-06 RX ORDER — SODIUM PICOSULFATE, MAGNESIUM OXIDE, AND ANHYDROUS CITRIC ACID 10; 3.5; 12 MG/160ML; G/160ML; G/160ML
LIQUID ORAL
Qty: 320 ML | Refills: 0 | Status: SHIPPED | OUTPATIENT
Start: 2023-03-06

## 2023-03-21 ENCOUNTER — ANESTHESIA EVENT (OUTPATIENT)
Dept: GASTROENTEROLOGY | Facility: AMBULATORY SURGERY CENTER | Age: 63
End: 2023-03-21

## 2023-03-21 ENCOUNTER — ANESTHESIA (OUTPATIENT)
Dept: GASTROENTEROLOGY | Facility: AMBULATORY SURGERY CENTER | Age: 63
End: 2023-03-21

## 2023-03-21 ENCOUNTER — HOSPITAL ENCOUNTER (OUTPATIENT)
Dept: GASTROENTEROLOGY | Facility: AMBULATORY SURGERY CENTER | Age: 63
Discharge: HOME | End: 2023-03-21

## 2023-03-21 VITALS
HEART RATE: 69 BPM | TEMPERATURE: 97.7 F | DIASTOLIC BLOOD PRESSURE: 78 MMHG | RESPIRATION RATE: 17 BRPM | WEIGHT: 116 LBS | OXYGEN SATURATION: 99 % | BODY MASS INDEX: 18.21 KG/M2 | SYSTOLIC BLOOD PRESSURE: 126 MMHG | HEIGHT: 67 IN

## 2023-03-21 DIAGNOSIS — Z80.0 FAMILY HISTORY OF COLON CANCER: ICD-10-CM

## 2023-03-21 DIAGNOSIS — Z86.010 HISTORY OF COLON POLYPS: ICD-10-CM

## 2023-03-21 RX ORDER — PROPOFOL 10 MG/ML
INJECTION, EMULSION INTRAVENOUS AS NEEDED
Status: DISCONTINUED | OUTPATIENT
Start: 2023-03-21 | End: 2023-03-21

## 2023-03-21 RX ORDER — SODIUM CHLORIDE, SODIUM LACTATE, POTASSIUM CHLORIDE, CALCIUM CHLORIDE 600; 310; 30; 20 MG/100ML; MG/100ML; MG/100ML; MG/100ML
50 INJECTION, SOLUTION INTRAVENOUS CONTINUOUS
Status: DISCONTINUED | OUTPATIENT
Start: 2023-03-21 | End: 2023-03-25 | Stop reason: HOSPADM

## 2023-03-21 RX ADMIN — PROPOFOL 50 MG: 10 INJECTION, EMULSION INTRAVENOUS at 08:10

## 2023-03-21 RX ADMIN — PROPOFOL 100 MG: 10 INJECTION, EMULSION INTRAVENOUS at 08:01

## 2023-03-21 RX ADMIN — PROPOFOL 100 MG: 10 INJECTION, EMULSION INTRAVENOUS at 08:03

## 2023-03-21 RX ADMIN — PROPOFOL 50 MG: 10 INJECTION, EMULSION INTRAVENOUS at 08:13

## 2023-03-21 RX ADMIN — SODIUM CHLORIDE, SODIUM LACTATE, POTASSIUM CHLORIDE, CALCIUM CHLORIDE 50 ML/HR: 600; 310; 30; 20 INJECTION, SOLUTION INTRAVENOUS at 07:57

## 2023-03-21 RX ADMIN — PROPOFOL 50 MG: 10 INJECTION, EMULSION INTRAVENOUS at 08:06

## 2023-03-21 RX ADMIN — PROPOFOL 50 MG: 10 INJECTION, EMULSION INTRAVENOUS at 08:16

## 2023-03-21 NOTE — ANESTHESIA PREPROCEDURE EVALUATION
Procedure:  COLONOSCOPY    Relevant Problems   CARDIO   (+) Mitral regurgitation (pt had echo 15 years ago, no followup required, denies CP/SOB/syncompe)        Physical Exam    Airway    Mallampati score: II  TM Distance: >3 FB  Neck ROM: full     Dental   No notable dental hx     Cardiovascular      Pulmonary      Other Findings        Anesthesia Plan  ASA Score- 2     Anesthesia Type- IV sedation with anesthesia with ASA Monitors  Additional Monitors:   Airway Plan:           Plan Factors-Exercise tolerance (METS): >4 METS  Chart reviewed  Patient summary reviewed  Patient is not a current smoker  Induction- intravenous  Postoperative Plan-     Informed Consent- Anesthetic plan and risks discussed with patient  I personally reviewed this patient with the CRNA  Discussed and agreed on the Anesthesia Plan with the CRNA  Shelton Souza

## 2023-03-21 NOTE — ANESTHESIA POSTPROCEDURE EVALUATION
Post-Op Assessment Note    CV Status:  Stable  Pain Score: 0    Pain management: adequate     Mental Status:  Sleepy   Hydration Status:  Euvolemic and stable   PONV Controlled:  None   Airway Patency:  Patent      Post Op Vitals Reviewed: Yes      Staff: CRNA         No notable events documented      /73 (03/21/23 0823)    Temp      Pulse 68 (03/21/23 0823)   Resp 16 (03/21/23 0823)    SpO2 99 % (03/21/23 0823)

## 2023-03-30 ENCOUNTER — TELEPHONE (OUTPATIENT)
Dept: OTHER | Facility: OTHER | Age: 63
End: 2023-03-30

## 2023-03-30 NOTE — TELEPHONE ENCOUNTER
I called patient, she was not able to see the actual results in mychart  I advised pathology is benign, 3 year recall